# Patient Record
(demographics unavailable — no encounter records)

---

## 2024-10-30 NOTE — DISCUSSION/SUMMARY
[FreeTextEntry1] : 65 yo F here for cv follow up with CAD multiple PCI last 3/2022, metabolic syndrome, and asthma/copd overlap with environmental allergies.  Ordered labwork after being back on HCTZ and now on PCSK9i (off statin). Ordered to do in a couple weeks.   Maintain plavix monotherapy. cardiac rehab declined. home sleep study recommended; she will discuss with pulmonary. diet/weight loss/lifestyle optimization. Mediterranean diet.  Follow in 6 months. call with labwork. ER precautions given to patient.

## 2024-10-30 NOTE — REASON FOR VISIT
[Symptom and Test Evaluation] : symptom and test evaluation [CV Risk Factors and Non-Cardiac Disease] : CV risk factors and non-cardiac disease [Coronary Artery Disease] : coronary artery disease [FreeTextEntry3] : Dr. Pranav Butt, Dr. Lydia Morelos

## 2024-10-30 NOTE — PHYSICAL EXAM
[Well Developed] : well developed [Well Nourished] : well nourished [Obese] : obese [Normal Conjunctiva] : normal conjunctiva [Normal Venous Pressure] : normal venous pressure [Normal S1, S2] : normal S1, S2 [No Rub] : no rub [Clear Lung Fields] : clear lung fields [Good Air Entry] : good air entry [Soft] : abdomen soft [Non Tender] : non-tender [No Cyanosis] : no cyanosis [No Clubbing] : no clubbing

## 2024-10-30 NOTE — HISTORY OF PRESENT ILLNESS
[FreeTextEntry1] : 65 yo F History of: CAD s/p PCI last 3/2022, HTN, HLD, obesity, pre-DM 2, asthma/copd overlap with environmental allergies, microcytic anemia. has hiatal hernia  11/2024 VISIT:  GLP approved. on lower dose rosuva and now on PCSK9i. august .a 1c 6.2.   5/2024 VISIT: started PCSK9i. stopped rosuvastatin due to leg pain. doing well on hctz. no hospitalizations.    3/2024 VISIT: feels well from cv standpoint. musculoskeletal back pain. dizziness and has low blood pressures after lowering sodium intake. mild transaminitis 70s. normal cpk. LDL 83   5/2023 VISIT: stable dyspnea on exertion no angina. Labs January LDL above goal and prediabetes. switched to Dr. Piyush Waslh for pulmonary.   1/2023 VISIT: saw pulm. LDL not at goal. gained weight. stressed due to family deaths recently. no angina or dyspnea. has hernia, had sleeve done.   7/2022 VISIT: atorva increased to 80. saw pulm. ett performed. wheezing resolved on spiriva. doing bike. did few sessions of cardiac rehab, difficult with logistics.   4/2022 VISIT: post unstable angina hospitalization. pci ostial rca details below. reports feeling her wheezing/dyspnea is much improved post pci. no further angina. compliant with medications.  **TESTING I REVIEWED TODAY excluding above:  9/2022 LABWORK: hgb 11.8. cmp. . tchol 227. trig 103.   5/2022:  ETT: [facility] 4. 5 min. no ischemia.  LABWORK: Hgb 12, LDL uncontrolled. normal cmp. ldl 127.   3/2022:  EKG NSR CORONARY ANGIOGRAM: successful IVUS-guided pci of ostial/mid RCA with 2.75x38 brett (post dil 3.5 mm). patent drca stent. moderate prox LAD ISR not sig by IFR. upper normal LVEDP. TTE: preserved biventricular function LABWORK: Hgb 10.7. .

## 2024-11-01 NOTE — DATA REVIEWED
[FreeTextEntry1] : Independent review of imaging and independent interpretation was performed at today's visit: - 2/27/24 Xray UGI single contrast study through [facility]

## 2024-11-01 NOTE — ASSESSMENT
[FreeTextEntry1] : Ms. Hopper had a previous gastric sleeve (about 10 years ago), who now complains of worsening reflux symptoms.  She does not want to have a gastric bypass.  I am recommending a Bravo test to decipher if she has significant reflux that requires a potential Linx procedure.

## 2024-11-01 NOTE — PHYSICAL EXAM
[General Appearance - Alert] : alert [General Appearance - In No Acute Distress] : in no acute distress [Neck Appearance] : the appearance of the neck was normal [] : no respiratory distress [Respiration, Rhythm And Depth] : normal respiratory rhythm and effort [Examination Of The Chest] : the chest was normal in appearance [Chest Visual Inspection Thoracic Asymmetry] : no chest asymmetry [Bowel Sounds] : normal bowel sounds [Abdomen Soft] : soft [Abdomen Tenderness] : non-tender [Skin Color & Pigmentation] : normal skin color and pigmentation [Skin Turgor] : normal skin turgor [Oriented To Time, Place, And Person] : oriented to person, place, and time

## 2024-11-01 NOTE — DATA REVIEWED
[FreeTextEntry1] : Independent review of imaging and independent interpretation was performed at today's visit: - 2/27/24 Xray UGI single contrast study through [facility]

## 2024-11-01 NOTE — HISTORY OF PRESENT ILLNESS
[FreeTextEntry1] : Ms. RODRIGUEZ REEDER is a 66-year-old female, referred by Dr. Brar, who presents for consultation regarding hiatal hernia.   Past medical history includes CAD s/p PCI last 3/2022, HTN, HLD, obesity, pre-DM 2, asthma/COPD overlap with environmental allergies, microcytic anemia, hernia. Prior history of sleeve gastrectomy and hiatal hernia repair.  2/27/24 Xray UGI single contrast study through [facility] - No acute findings and no change, in particular normal configuration of the stomach post sleeve and post hiatal hernia repair.  10/18/24 Office note - Dr. Brar - Consider for hiatal hernia repair

## 2024-11-01 NOTE — HISTORY OF PRESENT ILLNESS
[FreeTextEntry1] : Ms. RODRIGUEZ REEDER is a 66-year-old female, referred by Dr. Brar, who presents for consultation regarding hiatal hernia.   Past medical history includes CAD s/p PCI last 3/2022, HTN, HLD, obesity, pre-DM 2, asthma/COPD overlap with environmental allergies, microcytic anemia, hernia. Prior history of sleeve gastrectomy and hiatal hernia repair.  2/27/24 Xray UGI single contrast study through [facility] - No acute findings and no change, in particular normal configuration of the stomach post sleeve and post hiatal hernia repair.  10/18/24 Office note - Dr. Brar - Consider for hiatal hernia repair

## 2024-11-01 NOTE — HISTORY OF PRESENT ILLNESS
[FreeTextEntry1] : Ms. RODRIGUEZ REEDER is a 66-year-old female, referred by Dr. Brar, who presents for consultation regarding hiatal hernia.   Past medical history includes CAD s/p PCI last 3/2022, HTN, HLD, obesity, pre-DM 2, asthma/COPD overlap with environmental allergies, microcytic anemia, hernia. Prior history of sleeve gastrectomy and hiatal hernia repair.  2/27/24 Xray UGI single contrast study through [facility] - No acute findings and no change, in particular normal configuration of the stomach post sleeve and post hiatal hernia repair.  10/18/24 Office note - Dr. Brar - Consider for hiatal hernia repair

## 2024-11-01 NOTE — DATA REVIEWED
[FreeTextEntry1] : Independent review of imaging and independent interpretation was performed at today's visit: - 2/27/24 Xray UGI single contrast study through [facility]

## 2024-11-01 NOTE — DATA REVIEWED
[FreeTextEntry1] : Independent review of imaging and independent interpretation was performed at today's visit: - 2/27/24 Xray UGI single contrast study through [facility]

## 2024-12-03 NOTE — HISTORY OF PRESENT ILLNESS
[FreeTextEntry1] : 65 yo F History of: CAD s/p PCI last 3/2022, HTN, HLD, obesity, pre-DM 2, asthma/copd overlap with environmental allergies, microcytic anemia. has hiatal hernia  12/2024 VISIT: feels great. GLP approved. on PCSK9i and LDL 53. K 3.4. august .a 1c 6.2.  diagnosed osteoporosis.   5/2024 VISIT: started PCSK9i. stopped rosuvastatin due to leg pain. doing well on hctz. no hospitalizations.    3/2024 VISIT: feels well from cv standpoint. musculoskeletal back pain. dizziness and has low blood pressures after lowering sodium intake. mild transaminitis 70s. normal cpk. LDL 83   5/2023 VISIT: stable dyspnea on exertion no angina. Labs January LDL above goal and prediabetes. switched to Dr. Piyush Walsh for pulmonary.   1/2023 VISIT: saw pulm. LDL not at goal. gained weight. stressed due to family deaths recently. no angina or dyspnea. has hernia, had sleeve done.   7/2022 VISIT: atorva increased to 80. saw pulm. ett performed. wheezing resolved on spiriva. doing bike. did few sessions of cardiac rehab, difficult with logistics.   4/2022 VISIT: post unstable angina hospitalization. pci ostial rca details below. reports feeling her wheezing/dyspnea is much improved post pci. no further angina. compliant with medications.  **TESTING I REVIEWED TODAY excluding above:  9/2022 LABWORK: hgb 11.8. cmp. . tchol 227. trig 103.   5/2022:  ETT: [facility] 4. 5 min. no ischemia.  LABWORK: Hgb 12, LDL uncontrolled. normal cmp. ldl 127.   3/2022:  EKG NSR CORONARY ANGIOGRAM: successful IVUS-guided pci of ostial/mid RCA with 2.75x38 brett (post dil 3.5 mm). patent drca stent. moderate prox LAD ISR not sig by IFR. upper normal LVEDP. TTE: preserved biventricular function LABWORK: Hgb 10.7. .

## 2024-12-03 NOTE — DISCUSSION/SUMMARY
[FreeTextEntry1] : 67 yo F here for cv follow up with CAD multiple PCI last 3/2022, metabolic syndrome, and asthma/copd overlap with environmental allergies.  Maintain plavix monotherapy. cardiac rehab declined. maintain GLP and CVP visit.  home sleep study recommended; she will discuss with pulmonary.  LDL is optimized on PCSK9i/statin/zetia. Maintain for now.  Increase PO K+ as hypokalemia on HCTZ.  Follow in 6 months, serial labwork. ER precautions given to patient.

## 2024-12-03 NOTE — ADDENDUM
[FreeTextEntry1] : - Pre operative cardiovascular evaluation for colonoscopy/endoscopy: METS>4. No recent CV hospitalizations. No recent ACS, decompensated heart failure, preclusive arrhythmias. - Patient can proceed from a cardiovascular standpoint with\out further cardiac testing required prior to procedure. Continue cardiovascular medications as tolerated john-procedurally except can hold plavix up to 7 days prior and restart once cleared by proceduralist (DURING THIS TIME PERIOD OF INTERRUPTION, SHE SHOULD BE ON ASPIRIN 81 MG DAILY). Patient is not at a prohibitive risk for AMI or CHF john-procedurally.   -

## 2024-12-27 NOTE — ADDENDUM
----- Message from Angelito Hammond DO sent at 10/5/2019  9:45 AM CDT -----  Labs are essentially WNL including blood count. There is some trace blood in urine, likely related to upcoming menstrual cycle, recommend repeat 2wks RTC after 8/15/20 CPE or sooner PRN   [FreeTextEntry1] :  I personally seen the patient and performed the evaluation and management services. I discussed the care with the ACP , reviewed the note, and agree with plan of care as outlined above, except where documented below, which is my personal assessment/plan.  Obesity  CAD prior PCI PreDM BMI 37 --> 33 >20 lbs weight loss on Wegovy No major AEs, will cont 1mg weekly for now, can uptitrate if there is plateau in wt loss.  Check A1c.   HTN Asx hypotension with weight loss. Stop HCTZ and KCl.  Cont ramipril.  BMP 1 week.  Can add back lower dosing of thiazide if needed based on BP response.  Monitor BP at home. Followup here or PCP 1 month to check BP.

## 2024-12-27 NOTE — ASSESSMENT
[FreeTextEntry1] : RODRIGUEZ REEDER is a 65 year old F who presents today for cardiovascular prevention visit.   Obesity  CAD prior PCI PreDM BMI 37 --> 33 >20 lbs weight loss on Wegovy No major AEs, will cont 1mg weekly for now, can uptitrate if there is plateau in wt loss.  Check A1c.   HTN Asx hypotension with weight loss. Stop HCTZ and KCl.  Cont ramipril.  BMP 1 week.  Can add back lower dosing of thiazide if needed based on BP response.  Monitor BP at home. Followup here or PCP 1 month to check BP.   HLD LDL at goal <70 on repatha, zetia, rosuva  CAD management otherwise with Dr. Gould Adjunctive dietary and lifestyle modification measures have been reviewed.   Will cont monthly calls for GLP titration Any questions and concerns were addressed and resolved.   Sincerely,   ERENDIRA Clark Patient's history, testing, medications, and any relative changes to plan of care reviewed with supervising MD: Dr. Duke Phelps

## 2024-12-27 NOTE — HISTORY OF PRESENT ILLNESS
[FreeTextEntry1] : RODRIGUEZ REEDER is a 66 year old F who presents today for cardiovascular prevention visit.   Referred by Dr. Gould for weight management.   PMH CAD s/p PCI last 3/2022, HTN, HLD, obesity, pre-DM 2, asthma/copd overlap with environmental allergies, microcytic anemia, hernia.   on PCSK9i and wegovy LDL 53. K 3.4.  august .a 1c 6.2 Lab 5/2024 creat 0.75, k 4.1, , , a1c 6.2 She had gastric sleeve in 2016 but when her son passed away in 2019 she suffered from depression and gained weight back. She has tried multiple diets in attempt to lose weight without success over the years.  She had a consult with bariatric surgeon regarding bypass but decided against it.  She has now been on wegovy for 6 months. Dose uptitrated to 1mg weekly. No major adverse effects and > 20 lb weight loss.  BP is 98/52 today. She is asx.

## 2025-01-27 NOTE — HISTORY OF PRESENT ILLNESS
[FreeTextEntry1] : Patient is a 66-year-old female with past medical history of obesity, coronary artery disease s/p PCI, hypertension, hyperlipidemia who presents to the cardiology clinic for follow-up.  Patient was seen in December 2024 for her blood pressure was low normal and hence hydrochlorothiazide and potassium supplementation were stopped.  She is here for evaluation of her blood pressure.  Today her blood pressure is well-controlled.  Patient denies any symptoms of dizziness, presyncope or syncope.  She does not usually check her blood pressure at home.  She denies any nausea, vomiting.  She does state that when she increased the Wegovy dose to 1 mg she started having abdominal pain and irritation in her esophagus with 1 bite of food and hence is back on 0.5 mg of Wegovy weekly which she plans to start next week and is taking a break from the COVID this week.  She otherwise denies any chest pain, shortness of breath, orthopnea, PND.  She has lost about 27 pounds on her weight loss medication.

## 2025-01-27 NOTE — ASSESSMENT
[FreeTextEntry1] : 66-year-old female with past medical history of hypertension, hyperlipidemia, coronary artery disease, morbid obesity here for follow-up visit.  Her hydrochlorothiazide and potassium supplementation was stopped in December 2024 due to low normal blood pressures.  Today during her visit, blood pressure appears to be well-controlled.  She denies any signs and symptoms of hypotension or hypertension.  Appears that she did have issues on higher dose of Wegovy at 1 mg every week and hence is going to decrease the dose to 0.5 mg starting next week.  Hypertension Well-controlled, continue the enalapril at the same dose  Hyperlipidemia On 3 different medications with statin, Zetia and PCSK9 Lipid panel with total cholesterol at 149, triglycerides 73, HDL 82, LDL 53, VLDL 14  Hypokalemia Resolved Recent potassium was 3.5 mmol/L Patient is not on any diuretic Renal function within normal limits  Coronary artery disease Continue Plavix 75 mg daily, recent hemoglobin at 12 g/dL Lipid control as above  Obesity Continue with Wegovy 0.5 mg every week, she will call us back if she has issues on that dose as well  Patient will follow-up as scheduled with Dr. Gould

## 2025-01-27 NOTE — PHYSICAL EXAM
[TextEntry] :  Physical Exam: General: Alert and oriented, No acute distress. Eye: Normal conjunctiva. HENMT: Oral mucosa is moist. Neck: No JVD Respiratory: Lungs are clear to auscultation, Respirations are non-labored, Breath sounds are equal, Symmetrical chest wall expansion. Cardiovascular: Regular rate, Normal rhythm, S1, S2 normal. No murmur. Carotid upstrokes normal. Cardiac apical impulse normal. No parasternal lift. Peripheral pulses: radials 2+, posterior tibials 2+. No edema. Musculoskeletal: No chest wall tenderness. Integumentary: Warm, Dry, Pink Neurologic: No focal defects

## 2025-05-21 NOTE — HISTORY OF PRESENT ILLNESS
[Former] : Former [TextBox_[id number]] : Ms. REEDER is a 66 year old female with a history of CAD s/p stents, HTN, HLD and COPD.   Reviewed and confirmed that the patient meets screening eligibility criteria:   66 years old   Smoking Status: Former smoker   Number of pack(s) per day: 1.5 Number of years smoked: 40 Number of pack years smokin Quit year:    No symptoms of lung cancer, including new cough, change in cough, hemoptysis, and unintentional weight loss.   No personal history of lung cancer.  No lung cancer in a first degree relative.  No history of occupational exposures. [YearQuit] : 2015 [PacksperYear] : 60

## 2025-06-09 NOTE — PHYSICAL EXAM
[Well Developed] : well developed [Well Nourished] : well nourished [Obese] : obese [Normal Conjunctiva] : normal conjunctiva [Normal Venous Pressure] : normal venous pressure [Normal S1, S2] : normal S1, S2 [No Rub] : no rub [Clear Lung Fields] : clear lung fields [Good Air Entry] : good air entry [Non Tender] : non-tender [Soft] : abdomen soft [No Cyanosis] : no cyanosis [No Clubbing] : no clubbing

## 2025-06-10 NOTE — DISCUSSION/SUMMARY
[FreeTextEntry1] : 67 yo F here for cv follow up with CAD multiple PCI last 3/2022, metabolic syndrome, and asthma/copd overlap.  She is doing well from a cardiac standpoint. Her BP is elevated today as she did not take her medicine this morning.   She is moving to [place] in 1 month so we will update an echocardiogram and labwork.  Maintain plavix monotherapy. cardiac rehab declined. maintain GLP and CVP visits.  home sleep study recommended; she will discuss with pulmonary.  LDL is optimized on PCSK9i/statin/zetia.   ER precautions given to patient.

## 2025-06-10 NOTE — HISTORY OF PRESENT ILLNESS
[FreeTextEntry1] : 67 yo F History of: CAD s/p PCI last 3/2022, HTN, HLD, obesity, pre-DM 2, asthma/copd overlap with environmental allergies, microcytic anemia. has hiatal hernia  6/2025: no angina or dyspnea. diet noncompliance. pending moving to [place]. GLP.  january a1c 5.6.   12/2024 VISIT: feels great. GLP approved. on PCSK9i and LDL 53. K 3.4.  august . a 1c 6.2.  diagnosed osteoporosis.   5/2024 VISIT: started PCSK9i. stopped rosuvastatin due to leg pain. doing well on hctz. no hospitalizations.    3/2024 VISIT: feels well from cv standpoint. musculoskeletal back pain. dizziness and has low blood pressures after lowering sodium intake. mild transaminitis 70s. normal cpk. LDL 83   5/2023 VISIT: stable dyspnea on exertion no angina. Labs January LDL above goal and prediabetes. switched to Dr. Piyush Walsh for pulmonary.   1/2023 VISIT: saw pulm. LDL not at goal. gained weight. stressed due to family deaths recently. no angina or dyspnea. has hernia, had sleeve done.   7/2022 VISIT: atorva increased to 80. saw pulm. ett performed. wheezing resolved on spiriva. doing bike. did few sessions of cardiac rehab, difficult with logistics.   4/2022 VISIT: post unstable angina hospitalization. pci ostial rca details below. reports feeling her wheezing/dyspnea is much improved post pci. no further angina. compliant with medications.  **TESTING I REVIEWED TODAY excluding above:  9/2022 LABWORK: hgb 11.8. cmp. . tchol 227. trig 103.   5/2022:  ETT: [facility] 4. 5 min. no ischemia.  LABWORK: Hgb 12, LDL uncontrolled. normal cmp. ldl 127.   3/2022:  EKG NSR CORONARY ANGIOGRAM: successful IVUS-guided pci of ostial/mid RCA with 2.75x38 brett (post dil 3.5 mm). patent drca stent. moderate prox LAD ISR not sig by IFR. upper normal LVEDP. TTE: preserved biventricular function LABWORK: Hgb 10.7. .

## 2025-06-10 NOTE — DISCUSSION/SUMMARY
[FreeTextEntry1] : 65 yo F here for cv follow up with CAD multiple PCI last 3/2022, metabolic syndrome, and asthma/copd overlap.  She is doing well from a cardiac standpoint. Her BP is elevated today as she did not take her medicine this morning.   She is moving to [place] in 1 month so we will update an echocardiogram and labwork.  Maintain plavix monotherapy. cardiac rehab declined. maintain GLP and CVP visits.  home sleep study recommended; she will discuss with pulmonary.  LDL is optimized on PCSK9i/statin/zetia.   ER precautions given to patient.

## 2025-06-10 NOTE — HISTORY OF PRESENT ILLNESS
[FreeTextEntry1] : 65 yo F History of: CAD s/p PCI last 3/2022, HTN, HLD, obesity, pre-DM 2, asthma/copd overlap with environmental allergies, microcytic anemia. has hiatal hernia  6/2025: no angina or dyspnea. diet noncompliance. pending moving to [place]. GLP.  january a1c 5.6.   12/2024 VISIT: feels great. GLP approved. on PCSK9i and LDL 53. K 3.4.  august . a 1c 6.2.  diagnosed osteoporosis.   5/2024 VISIT: started PCSK9i. stopped rosuvastatin due to leg pain. doing well on hctz. no hospitalizations.    3/2024 VISIT: feels well from cv standpoint. musculoskeletal back pain. dizziness and has low blood pressures after lowering sodium intake. mild transaminitis 70s. normal cpk. LDL 83   5/2023 VISIT: stable dyspnea on exertion no angina. Labs January LDL above goal and prediabetes. switched to Dr. Piyush Walsh for pulmonary.   1/2023 VISIT: saw pulm. LDL not at goal. gained weight. stressed due to family deaths recently. no angina or dyspnea. has hernia, had sleeve done.   7/2022 VISIT: atorva increased to 80. saw pulm. ett performed. wheezing resolved on spiriva. doing bike. did few sessions of cardiac rehab, difficult with logistics.   4/2022 VISIT: post unstable angina hospitalization. pci ostial rca details below. reports feeling her wheezing/dyspnea is much improved post pci. no further angina. compliant with medications.  **TESTING I REVIEWED TODAY excluding above:  9/2022 LABWORK: hgb 11.8. cmp. . tchol 227. trig 103.   5/2022:  ETT: [facility] 4. 5 min. no ischemia.  LABWORK: Hgb 12, LDL uncontrolled. normal cmp. ldl 127.   3/2022:  EKG NSR CORONARY ANGIOGRAM: successful IVUS-guided pci of ostial/mid RCA with 2.75x38 brett (post dil 3.5 mm). patent drca stent. moderate prox LAD ISR not sig by IFR. upper normal LVEDP. TTE: preserved biventricular function LABWORK: Hgb 10.7. .

## 2025-06-24 NOTE — DISCUSSION/SUMMARY
[FreeTextEntry1] : 66 yo F here for cv follow up with CAD multiple PCI last 3/2022, borderline metabolic syndrome, and asthma/copd overlap.  She is doing well from a cardiac standpoint although her LDL and BP are uncontrolled now due to dietary noncompliance because she is packing/moving. She will maintain PCSK9i/statin/zetia.   Maintain plavix monotherapy. cardiac rehab declined  home sleep study recommended; she will discuss with pulmonary.  She is moving to [place]. Given records and our info. Return as needed. ER precautions given to patient.

## 2025-06-24 NOTE — HISTORY OF PRESENT ILLNESS
[FreeTextEntry1] : 68 yo F History of: CAD s/p PCI last 3/2022, borderline metabolic syndrome, asthma/copd overlap with environmental allergies.  For historical reference, she has microcytic anemia and a hiatal hernia.   6/2025 VISIT: dietary noncompliance causing lipid and BP elevated (she is packing and only doing take-out etc). no angina.  labwork: hgb 11.9, , cmp, a1c 5.4 echocardiogram unchanged. moderate LAE  6/2025: no angina or dyspnea. diet noncompliance. pending moving to [place]. GLP.  january a1c 5.6.   12/2024 VISIT: feels great. GLP approved. on PCSK9i and LDL 53. K 3.4.  august . a 1c 6.2.  diagnosed osteoporosis.   5/2024 VISIT: started PCSK9i. stopped rosuvastatin due to leg pain. doing well on hctz. no hospitalizations.    3/2024 VISIT: feels well from cv standpoint. musculoskeletal back pain. dizziness and has low blood pressures after lowering sodium intake. mild transaminitis 70s. normal cpk. LDL 83   5/2023 VISIT: stable dyspnea on exertion no angina. Labs January LDL above goal and prediabetes. switched to Dr. Piyush Walsh for pulmonary.   1/2023 VISIT: saw pulm. LDL not at goal. gained weight. stressed due to family deaths recently. no angina or dyspnea. has hernia, had sleeve done.   7/2022 VISIT: atorva increased to 80. saw pulm. ett performed. wheezing resolved on spiriva. doing bike. did few sessions of cardiac rehab, difficult with logistics.   4/2022 VISIT: post unstable angina hospitalization. pci ostial rca details below. reports feeling her wheezing/dyspnea is much improved post pci. no further angina. compliant with medications.  **TESTING I REVIEWED TODAY excluding above:  9/2022 LABWORK: hgb 11.8. cmp. . tchol 227. trig 103.   5/2022:  ETT: [facility] 4. 5 min. no ischemia.  LABWORK: Hgb 12, LDL uncontrolled. normal cmp. ldl 127.   3/2022:  EKG NSR CORONARY ANGIOGRAM: successful IVUS-guided pci of ostial/mid RCA with 2.75x38 brett (post dil 3.5 mm). patent drca stent. moderate prox LAD ISR not sig by IFR. upper normal LVEDP. TTE: preserved biventricular function LABWORK: Hgb 10.7. .

## 2025-06-24 NOTE — DISCUSSION/SUMMARY
[FreeTextEntry1] : 68 yo F here for cv follow up with CAD multiple PCI last 3/2022, borderline metabolic syndrome, and asthma/copd overlap.  She is doing well from a cardiac standpoint although her LDL and BP are uncontrolled now due to dietary noncompliance because she is packing/moving. She will maintain PCSK9i/statin/zetia.   Maintain plavix monotherapy. cardiac rehab declined  home sleep study recommended; she will discuss with pulmonary.  She is moving to [place]. Given records and our info. Return as needed. ER precautions given to patient.